# Patient Record
Sex: MALE | Race: WHITE | NOT HISPANIC OR LATINO | Employment: OTHER | URBAN - METROPOLITAN AREA
[De-identification: names, ages, dates, MRNs, and addresses within clinical notes are randomized per-mention and may not be internally consistent; named-entity substitution may affect disease eponyms.]

---

## 2022-06-13 ENCOUNTER — TRANSFERRED RECORDS (OUTPATIENT)
Dept: HEALTH INFORMATION MANAGEMENT | Facility: CLINIC | Age: 82
End: 2022-06-13

## 2022-06-28 ENCOUNTER — HOSPITAL ENCOUNTER (OUTPATIENT)
Dept: NUCLEAR MEDICINE | Facility: HOSPITAL | Age: 82
Discharge: HOME | End: 2022-06-28
Attending: INTERNAL MEDICINE
Payer: COMMERCIAL

## 2022-06-28 ENCOUNTER — HOSPITAL ENCOUNTER (OUTPATIENT)
Dept: CT IMAGING | Facility: HOSPITAL | Age: 82
Discharge: HOME | End: 2022-06-28
Attending: INTERNAL MEDICINE
Payer: COMMERCIAL

## 2022-06-28 DIAGNOSIS — C61 PROSTATE CANCER (H): ICD-10-CM

## 2022-06-28 LAB
CREAT BLD-MCNC: 0.9 MG/DL (ref 0.7–1.3)
GFR SERPL CREATININE-BSD FRML MDRD: >60 ML/MIN/1.73M2

## 2022-06-28 PROCEDURE — 78306 BONE IMAGING WHOLE BODY: CPT

## 2022-06-28 PROCEDURE — 250N000011 HC RX IP 250 OP 636: Performed by: INTERNAL MEDICINE

## 2022-06-28 PROCEDURE — 74177 CT ABD & PELVIS W/CONTRAST: CPT

## 2022-06-28 PROCEDURE — 343N000001 HC RX 343: Performed by: INTERNAL MEDICINE

## 2022-06-28 PROCEDURE — A9503 TC99M MEDRONATE: HCPCS | Performed by: INTERNAL MEDICINE

## 2022-06-28 PROCEDURE — 82565 ASSAY OF CREATININE: CPT

## 2022-06-28 RX ORDER — TC 99M MEDRONATE 20 MG/10ML
20-30 INJECTION, POWDER, LYOPHILIZED, FOR SOLUTION INTRAVENOUS ONCE
Status: COMPLETED | OUTPATIENT
Start: 2022-06-28 | End: 2022-06-28

## 2022-06-28 RX ORDER — IOPAMIDOL 755 MG/ML
75 INJECTION, SOLUTION INTRAVASCULAR ONCE
Status: COMPLETED | OUTPATIENT
Start: 2022-06-28 | End: 2022-06-28

## 2022-06-28 RX ADMIN — IOPAMIDOL 75 ML: 755 INJECTION, SOLUTION INTRAVENOUS at 07:20

## 2022-06-28 RX ADMIN — TC 99M MEDRONATE 27 MCI.: 20 INJECTION, POWDER, LYOPHILIZED, FOR SOLUTION INTRAVENOUS at 06:50

## 2022-07-21 ENCOUNTER — TRANSFERRED RECORDS (OUTPATIENT)
Dept: HEALTH INFORMATION MANAGEMENT | Facility: CLINIC | Age: 82
End: 2022-07-21

## 2023-01-01 ENCOUNTER — TELEPHONE (OUTPATIENT)
Dept: INTERVENTIONAL RADIOLOGY/VASCULAR | Facility: CLINIC | Age: 83
End: 2023-01-01
Payer: COMMERCIAL

## 2023-01-01 ENCOUNTER — TRANSFERRED RECORDS (OUTPATIENT)
Dept: HEALTH INFORMATION MANAGEMENT | Facility: CLINIC | Age: 83
End: 2023-01-01
Payer: COMMERCIAL

## 2023-01-01 ENCOUNTER — TRANSCRIBE ORDERS (OUTPATIENT)
Dept: OTHER | Age: 83
End: 2023-01-01

## 2023-01-01 ENCOUNTER — APPOINTMENT (OUTPATIENT)
Dept: INTERPRETER SERVICES | Facility: CLINIC | Age: 83
End: 2023-01-01
Payer: COMMERCIAL

## 2023-01-01 ENCOUNTER — ONCOLOGY VISIT (OUTPATIENT)
Dept: ONCOLOGY | Facility: CLINIC | Age: 83
End: 2023-01-01
Attending: INTERNAL MEDICINE
Payer: COMMERCIAL

## 2023-01-01 ENCOUNTER — PATIENT OUTREACH (OUTPATIENT)
Dept: ONCOLOGY | Facility: CLINIC | Age: 83
End: 2023-01-01
Payer: COMMERCIAL

## 2023-01-01 ENCOUNTER — PRE VISIT (OUTPATIENT)
Dept: ONCOLOGY | Facility: CLINIC | Age: 83
End: 2023-01-01
Payer: COMMERCIAL

## 2023-01-01 ENCOUNTER — TELEPHONE (OUTPATIENT)
Dept: INTERVENTIONAL RADIOLOGY/VASCULAR | Facility: HOSPITAL | Age: 83
End: 2023-01-01
Payer: COMMERCIAL

## 2023-01-01 ENCOUNTER — HOSPITAL ENCOUNTER (OUTPATIENT)
Dept: ULTRASOUND IMAGING | Facility: HOSPITAL | Age: 83
Discharge: HOME | End: 2023-09-05
Attending: INTERNAL MEDICINE | Admitting: INTERNAL MEDICINE
Payer: COMMERCIAL

## 2023-01-01 ENCOUNTER — TRANSFERRED RECORDS (OUTPATIENT)
Dept: HEALTH INFORMATION MANAGEMENT | Facility: CLINIC | Age: 83
End: 2023-01-01

## 2023-01-01 VITALS
WEIGHT: 126.4 LBS | DIASTOLIC BLOOD PRESSURE: 84 MMHG | OXYGEN SATURATION: 99 % | SYSTOLIC BLOOD PRESSURE: 147 MMHG | HEART RATE: 89 BPM | RESPIRATION RATE: 16 BRPM | TEMPERATURE: 98.2 F | BODY MASS INDEX: 17.14 KG/M2

## 2023-01-01 VITALS
SYSTOLIC BLOOD PRESSURE: 136 MMHG | OXYGEN SATURATION: 98 % | RESPIRATION RATE: 20 BRPM | DIASTOLIC BLOOD PRESSURE: 74 MMHG | HEIGHT: 72 IN | TEMPERATURE: 98.3 F | WEIGHT: 127 LBS | BODY MASS INDEX: 17.2 KG/M2 | HEART RATE: 70 BPM

## 2023-01-01 DIAGNOSIS — Z01.818 PRE-OP EXAM: Primary | ICD-10-CM

## 2023-01-01 DIAGNOSIS — C61 PROSTATE CANCER (H): ICD-10-CM

## 2023-01-01 DIAGNOSIS — C61 PROSTATE CANCER (H): Primary | ICD-10-CM

## 2023-01-01 LAB
ERYTHROCYTE [DISTWIDTH] IN BLOOD BY AUTOMATED COUNT: 18.1 % (ref 10–15)
HCT VFR BLD AUTO: 34.1 % (ref 40–53)
HGB BLD-MCNC: 11 G/DL (ref 13.3–17.7)
INR PPP: 1.1 (ref 0.85–1.15)
MCH RBC QN AUTO: 27.2 PG (ref 26.5–33)
MCHC RBC AUTO-ENTMCNC: 32.3 G/DL (ref 31.5–36.5)
MCV RBC AUTO: 84 FL (ref 78–100)
PATH REPORT.COMMENTS IMP SPEC: ABNORMAL
PATH REPORT.COMMENTS IMP SPEC: YES
PATH REPORT.FINAL DX SPEC: ABNORMAL
PATH REPORT.GROSS SPEC: ABNORMAL
PATH REPORT.MICROSCOPIC SPEC OTHER STN: ABNORMAL
PATH REPORT.RELEVANT HX SPEC: ABNORMAL
PHOTO IMAGE: ABNORMAL
PLATELET # BLD AUTO: 195 10E3/UL (ref 150–450)
RBC # BLD AUTO: 4.04 10E6/UL (ref 4.4–5.9)
SPECIMEN STATUS: NORMAL
WBC # BLD AUTO: 13.6 10E3/UL (ref 4–11)

## 2023-01-01 PROCEDURE — 85014 HEMATOCRIT: CPT | Performed by: RADIOLOGY

## 2023-01-01 PROCEDURE — 36415 COLL VENOUS BLD VENIPUNCTURE: CPT | Performed by: RADIOLOGY

## 2023-01-01 PROCEDURE — 88342 IMHCHEM/IMCYTCHM 1ST ANTB: CPT | Mod: 26 | Performed by: PATHOLOGY

## 2023-01-01 PROCEDURE — 250N000011 HC RX IP 250 OP 636: Performed by: RADIOLOGY

## 2023-01-01 PROCEDURE — G0463 HOSPITAL OUTPT CLINIC VISIT: HCPCS | Performed by: INTERNAL MEDICINE

## 2023-01-01 PROCEDURE — 272N000717 US BIOPSY LIVER

## 2023-01-01 PROCEDURE — 88341 IMHCHEM/IMCYTCHM EA ADD ANTB: CPT | Mod: 26 | Performed by: PATHOLOGY

## 2023-01-01 PROCEDURE — 88307 TISSUE EXAM BY PATHOLOGIST: CPT | Mod: 26 | Performed by: PATHOLOGY

## 2023-01-01 PROCEDURE — 250N000011 HC RX IP 250 OP 636: Mod: JZ | Performed by: INTERNAL MEDICINE

## 2023-01-01 PROCEDURE — 99205 OFFICE O/P NEW HI 60 MIN: CPT | Performed by: INTERNAL MEDICINE

## 2023-01-01 PROCEDURE — 85610 PROTHROMBIN TIME: CPT | Performed by: RADIOLOGY

## 2023-01-01 PROCEDURE — 88305 TISSUE EXAM BY PATHOLOGIST: CPT | Mod: TC | Performed by: INTERNAL MEDICINE

## 2023-01-01 PROCEDURE — 88333 PATH CONSLTJ SURG CYTO XM 1: CPT | Mod: 26 | Performed by: PATHOLOGY

## 2023-01-01 RX ORDER — NALOXONE HYDROCHLORIDE 0.4 MG/ML
0.4 INJECTION, SOLUTION INTRAMUSCULAR; INTRAVENOUS; SUBCUTANEOUS
Status: DISCONTINUED | OUTPATIENT
Start: 2023-01-01 | End: 2023-01-01 | Stop reason: HOSPADM

## 2023-01-01 RX ORDER — NALOXONE HYDROCHLORIDE 0.4 MG/ML
0.2 INJECTION, SOLUTION INTRAMUSCULAR; INTRAVENOUS; SUBCUTANEOUS
Status: DISCONTINUED | OUTPATIENT
Start: 2023-01-01 | End: 2023-01-01 | Stop reason: HOSPADM

## 2023-01-01 RX ORDER — FENTANYL CITRATE 50 UG/ML
25-50 INJECTION, SOLUTION INTRAMUSCULAR; INTRAVENOUS EVERY 5 MIN PRN
Status: DISCONTINUED | OUTPATIENT
Start: 2023-01-01 | End: 2023-01-01 | Stop reason: HOSPADM

## 2023-01-01 RX ORDER — HEPARIN SODIUM (PORCINE) LOCK FLUSH IV SOLN 100 UNIT/ML 100 UNIT/ML
500 SOLUTION INTRAVENOUS ONCE
Status: COMPLETED | OUTPATIENT
Start: 2023-01-01 | End: 2023-01-01

## 2023-01-01 RX ORDER — LIDOCAINE 40 MG/G
CREAM TOPICAL
Status: DISCONTINUED | OUTPATIENT
Start: 2023-01-01 | End: 2023-01-01 | Stop reason: HOSPADM

## 2023-01-01 RX ORDER — FLUMAZENIL 0.1 MG/ML
0.2 INJECTION, SOLUTION INTRAVENOUS
Status: DISCONTINUED | OUTPATIENT
Start: 2023-01-01 | End: 2023-01-01 | Stop reason: HOSPADM

## 2023-01-01 RX ADMIN — MIDAZOLAM HYDROCHLORIDE 1 MG: 1 INJECTION, SOLUTION INTRAMUSCULAR; INTRAVENOUS at 10:42

## 2023-01-01 RX ADMIN — FENTANYL CITRATE 50 MCG: 50 INJECTION, SOLUTION INTRAMUSCULAR; INTRAVENOUS at 10:45

## 2023-01-01 RX ADMIN — HEPARIN 500 UNITS: 100 SYRINGE at 12:54

## 2023-01-01 ASSESSMENT — PAIN SCALES - GENERAL: PAINLEVEL: NO PAIN (0)

## 2023-02-15 ENCOUNTER — TRANSFERRED RECORDS (OUTPATIENT)
Dept: HEALTH INFORMATION MANAGEMENT | Facility: CLINIC | Age: 83
End: 2023-02-15

## 2023-03-30 ENCOUNTER — TRANSFERRED RECORDS (OUTPATIENT)
Dept: HEALTH INFORMATION MANAGEMENT | Facility: CLINIC | Age: 83
End: 2023-03-30

## 2023-07-18 ENCOUNTER — LAB REQUISITION (OUTPATIENT)
Dept: LAB | Facility: CLINIC | Age: 83
End: 2023-07-18
Payer: COMMERCIAL

## 2023-07-18 DIAGNOSIS — C61 MALIGNANT NEOPLASM OF PROSTATE (H): ICD-10-CM

## 2023-07-19 LAB
CREAT SERPL-MCNC: 0.98 MG/DL (ref 0.67–1.17)
ERYTHROCYTE [DISTWIDTH] IN BLOOD BY AUTOMATED COUNT: NORMAL %
GFR SERPL CREATININE-BSD FRML MDRD: 77 ML/MIN/1.73M2
HCT VFR BLD AUTO: NORMAL %
HGB BLD-MCNC: NORMAL G/DL
MCH RBC QN AUTO: NORMAL PG
MCHC RBC AUTO-ENTMCNC: NORMAL G/DL
MCV RBC AUTO: NORMAL FL
PLATELET # BLD AUTO: NORMAL 10*3/UL
RBC # BLD AUTO: NORMAL 10*6/UL
WBC # BLD AUTO: NORMAL 10*3/UL

## 2023-07-19 PROCEDURE — P9604 ONE-WAY ALLOW PRORATED TRIP: HCPCS | Mod: ORL | Performed by: FAMILY MEDICINE

## 2023-07-19 PROCEDURE — 36415 COLL VENOUS BLD VENIPUNCTURE: CPT | Mod: ORL | Performed by: FAMILY MEDICINE

## 2023-07-19 PROCEDURE — 82565 ASSAY OF CREATININE: CPT | Mod: ORL | Performed by: FAMILY MEDICINE

## 2023-07-26 ENCOUNTER — TRANSFERRED RECORDS (OUTPATIENT)
Dept: HEALTH INFORMATION MANAGEMENT | Facility: CLINIC | Age: 83
End: 2023-07-26

## 2023-07-31 ENCOUNTER — TRANSFERRED RECORDS (OUTPATIENT)
Dept: HEALTH INFORMATION MANAGEMENT | Facility: CLINIC | Age: 83
End: 2023-07-31
Payer: COMMERCIAL

## 2023-08-01 ENCOUNTER — MEDICAL CORRESPONDENCE (OUTPATIENT)
Dept: HEALTH INFORMATION MANAGEMENT | Facility: CLINIC | Age: 83
End: 2023-08-01
Payer: COMMERCIAL

## 2023-08-11 ENCOUNTER — TELEPHONE (OUTPATIENT)
Dept: INTERVENTIONAL RADIOLOGY/VASCULAR | Facility: CLINIC | Age: 83
End: 2023-08-11
Payer: COMMERCIAL

## 2023-08-15 ENCOUNTER — TELEPHONE (OUTPATIENT)
Dept: INTERVENTIONAL RADIOLOGY/VASCULAR | Facility: CLINIC | Age: 83
End: 2023-08-15

## 2023-08-15 RX ORDER — ASPIRIN 81 MG/1
81 TABLET, CHEWABLE ORAL DAILY
COMMUNITY

## 2023-09-05 NOTE — PRE-PROCEDURE
GENERAL PRE-PROCEDURE:   Procedure:  Ultrasound guided liver biopsy with moderate sedation  Date/Time:  9/5/2023 10:04 AM    Risks and benefits: Risks, benefits and alternatives were discussed    Consent given by:  Patient  Patient states understanding of procedure being performed: Yes    Patient's understanding of procedure matches consent: Yes    Procedure consent matches procedure scheduled: Yes    Expected level of sedation:  Moderate  Appropriately NPO:  Yes  Mallampati  :  Grade 2- soft palate, base of uvula, tonsillar pillars, and portion of posterior pharyngeal wall visible  Lungs:  Lungs clear with good breath sounds bilaterally  Heart:  Normal heart sounds and rate  History & Physical reviewed:  History and physical reviewed and no updates needed  Statement of review:  I have reviewed the lab findings, diagnostic data, medications, and the plan for sedation

## 2023-09-05 NOTE — PROGRESS NOTES
Pt discharged to home accompanied by daughter.  Instructions reviewed and acknowledged.  Port de-accessed per protocol.

## 2023-11-21 NOTE — PROGRESS NOTES
New Patient Oncology Nurse Navigator Note     Referring provider:   [facility] Tanvir Lemos     Referred to (specialty): Medical Oncology    Requested provider (if applicable):   Dr. Bruno     Date Referral Received:   11/21/23     Evaluation for :   Consideration of Lutetium 177- PSMA Therapy/Pluvicto   Metastatic Castrate Resistant Prostate Cancer    Clinical History (per Nurse review of records provided):    Patient with mCRPC, with progression on multiple lines of treatment, referred for discussion of Pluvicto.  See [facility] notes (Care Everywhere) for full history.     Pertinent urology notes, pathology/labs & imaging bookmarked**    Records Location (Care Everywhere, Media, etc.):   [facility]   Care Everywhere      Please call Roseline (daughter) [phone]    I called Roseline, daughter, to discuss referral to oncology.  I introduced my role and reviewed what this consult visit will entail/what to expect.  I reviewed the location and gave contact numbers including new patient scheduling and clinic phone numbers.   Roseline, has no other questions at this time.    I forwarded on referral with scheduling instructions for the following     PLAN: SCHEDULE: HOLD: Dr. Rogers @ [facility], 11/28,  3-4 PM, NEW, in person    I warm transferred call to our new patient scheduling to finalize appointment.    Shara Vu, RN, BSN  Oncology New Patient Nurse Navigator   [facility]  [phone]

## 2023-11-22 NOTE — TELEPHONE ENCOUNTER
RECORDS STATUS - ALL OTHER DIAGNOSIS      Action    Action Taken 11/22/23  Spoke w/ [facility] - they will fax over Bx Report from 9/2014. Advised slides no longer exist.     Report received, sent to [facility] for STAT upload, emailed to NN email, JEOVANNY VARGHESE     Spoke w/ Marlys @ [facility] - they will fax Radiation Treatment Summary, contact [facility] to push imaging & fax reports. Marlys will also reach out to Dr. Lemos to sign off on [facility] Report (9/2023) & [facility] Report (6/2022).   11:58 AM    Records from [facility], imaging reports, [facility] & [facility] reports received, sent to [facility] for STAT upload.     Bx report from [facility] received, sent to HIM, emailed to  nicholas, JEOVANNY VARGHESE   2:11 PM      RECORDS [facility], [facility]   DATE RECEIVED: 11/22   NOTES STATUS DETAILS   OFFICE NOTE from referring provider DENISE - [facility] Dr. Tanvir Lemos   OFFICE NOTE from medical oncologist CE - [facility] Dr. Lemos: 9/26/23   OFFICE NOTE from radiation oncologist CE - [facility],  [facility]  Dr. Julio César Rodriguez: 8/11/23    [facility]  Dr. Darwin Hernadez: 6/13/05 - 8/18/05   DISCHARGE SUMMARY from hospital CE - [facility] 10/16/23, 6/23/23, 9/24/21   DISCHARGE REPORT from the ER CE Many   OPERATIVE REPORT CE - [facility] 10/20/23, 6/26/23, 4/19/23, 11/16/22, 6/24/22, 3/18/22, 12/15/21, 6/25/21, 2/3/21, 12/30/20: Cystoscopy  6/28/23: Open Abdominal Exploration  9/26/21: Left Uretal Stent Exchange   MEDICATION LIST CE [facility]   LABS     PATHOLOGY REPORTS Shwetha Roblero - Not requested per reports  12/30/20: [id number]  2/3/21: [id number]    [facility]  9/5/23: Surg Path   ANYTHING RELATED TO DIAGNOSIS CE - [facility] 11/13/23   GENONOMIC TESTING     TYPE: [facility] - Received 11/22 [facility], [facility]   IMAGING (NEED IMAGES & REPORT)     CT SCANS PACS [facility]  5/23/21 - 10/17/23    [facility]  3/24/17   MRI PACS [facility]  10/18/19, 10/18/18   XR PACS [facility]  12/30/20 - 10/20/23    ULTRASOUND PACS [facility]  3/2/17   PET PACS [facility]  11/17/23, 2/2023

## 2023-11-28 NOTE — LETTER
11/28/2023         RE: Jose Maria Lawrence  [address]  [place] [address]        Dear Colleague,    Thank you for referring your patient, Jose Maria Lawrence, to the [facility]. Please see a copy of my visit note below.    [facility]    NEW PATIENT VISIT NOTE    PATIENT NAME: Jose Maria Lawrence MRN # [id number]  DATE OF VISIT: November 28, 2023 YOB: 1940    REFERRING PROVIDER: Tanvir Lemos MD  [facility] [place]  [address] [place] [address]    CANCER TYPE: Prostate cancer  STAGE: IV - bony metastasis       BRIEF ONCOLOGIC HISTORY:  From 12/20 - 7/22 he was on Abiraterone + Prednisone. Upon progression, he completed six cycles of Docetaxel (C1 at 50mg/m2, C2-6 at 75mg/m2) from 7/21 - 11/3/22.  He tolerated this very well, with a very appropriate decline in his PSA, from 40 June 2022, to 5.2 on 10/13/22.  Unfortunately, shortly after completing chemotherapy in November 2022, he developed recurrent skeletal pain. 2/15/23 PSMA PET/CT showed recurrent scattered osseous metastases, concordant from a PSA that had risen from 6.7 November 28, 2022, 254.2 on February 3, 2023. From 3/1/22 - 6/3/22, he completed 3 cycles of Radium 223, with resolution of his rib pain, but interval increase in his PSA.   From 6/23 - 7/7/23 he was admitted to [facility] with gross hematuria, with clots x 1-2 days. Urology consulted, attempted continuous bladder irrigation, but bleeding continued. On 6/26/23 he underwent cystoscopy, clot evacuation, fulguration and stent exchange. On 6/28, he developed worsening shortness of breath, CT CAP showed new moderate ascites, CT cystogram showed intraperitoneal bladder rupture. On 6/28, he underwent emergent abdominal exploration with repair of the intraperitoneal bladder rupture, s/p 4L of urine irrigated and drained from the abdomen. 5mm rupture of the posterior bladder found, repaired. S/p  placement of suprapubic catheter.     His PSMA PET from July 26, 2023 after 4 cycles of Radium 223 showed interval resolution of the thoracic and left rib lesions, but increase in the osseous lesions in the lumbar spine, and lower pelvis.  This corresponds with his areas of pain.  Furthermore, a CT abd/pelvis obtained in the hospital July 2023, showed by bilobar liver lesions, multiple, of which were not PSMA PET avid.  Thus, he is not a good candidate for Lutetium therapy, as the disease in his liver is most life-threatening and would likely not respond to PSMA directed therapy.     Melinda started Cabazitaxel at 16mg/m2, with Neulasta support, dose reduced for age and performance status on 8/22/23. His cycle 2 was delayed by 1 week for UTI, erendira-suprapubic catheter cellulitis. He is s/p cycle 3 on 9/26/23. Cycle 4 was delayed due to hypovolemic shock secondary urinary infection and diarrhea. Suprapubic catheter was replaced on 10/17/23 and ureteral stent replacement on 10/20/23. He has been recovering.       TREATMENT SUMMARY:  9/20/2004: Radical retropubic prostatectomy, pathology showed extensive grade 5+4 adenocarcinoma involving nearly the entire prostate with lymphatic and PNI. R and L pelvic nodes were negative. Seminal vesicles were also involved. pT3b. S/p 2 years Lupron and adjuvant radiation 8/2005.   6/11/2010 PSA 0.04, consistent with biochemical recurrence. 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 He was started on Bicalutamide in addition to ADT.   11/6/20 Bone scan showed increased uptake in the L sixth rib, very suspicious for metastases.   12/2/20 He was switched to Abiraterone 1000mg daily along with prednisone 5mg daily.   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   6/28/22 bone scan shows marked uptake in the L posterior sixth rib, additional metastasis i the nearly upper thoracic spine, vertebral bodies, suspected at T4 and T5.  7/21/2022 patient initiated docetaxel 60 mg/m2 given a dose reduction secondary to patient's age.  He will also receive on body Neulasta for neutropenia prevention.  8/11/22 Docetaxel increased to 75mg/m2, completed 6 cycles 11/3/22  3/1/22 - 6/3/22, he completed 3 cycles of Radium 223, with resolution of his rib pain, but interval increase in his PSA, from  54.2 February 3, 2023, to 123 6 June 2, 2023.   7/26/23 PET/CT: While the lesions in the thoracic spine and left ribs have essentially resolved, other sites of radiotracer positive osseous lesions have increased In radiotracer activity and there is development of multiple new radiotracer positive osseous lesions suspicious for progression of disease.  8/22/23 C1D1 Cabazitaxel 16mg/m2 prednisone 10mg daily with Neulasta  9/5/23 L liver lesion: metastatic adenocarcinoma, moderately differentiated, with moderate to marked fibrosis, most consistent with metastasis from prostatic carcinoma. CARIS shows genomic JERMAINE but no actionable mutations      CURRENT INTERVENTIONS:  Cabazitaxel 16 mg/m   every other week with prednisone 5 mg q 12h daily,   leuprolide  (gets with [facility])  Zometa every 3 months  Ferra heme      HISTORY OF PRESENT ILLNESS   Jose Maria Lawrence is 83 year old male with PMH of hypertension, diabetes mellitus type II and CRPC has been referred for lutetium therapy.     Jose Maria is here with his daughter. He can speak English but his daughter helps with translations. Following history is presented by patient. History from charts is appended above.     When he came to [facility] during a routine physical examination he was noted to have prostate abnormality. He was diagnosed with prostate cancer. He had resection of prostate. He was taking medications and things were fine for long  time. He was being monitored by Dr. Shashi Workman. He had rising PSA values and he was referred to medical oncologist - Dr. Lemos. It was spreading and he took rounds of chemotherapy.     He had bleeding in the bladder and he had cauterization. He ended up having a bladder perforation and was in the ICU. He had to come off chemotherapy during his time in ICU. He now has indwelling urinary catheter. He does get recurrent urinary infections. He has been referred now for his progressive disease.     He is currently retired. He is not active due to his disease. He has a significant set back this time around. He used to be active in the past and used to walk 5 mi daily. He has 4 kids and they help him out. They have been providing with food, medicines. He has sold his car. He is now weak that he is post several rounds of chemotherapy.      PAST MEDICAL HISTORY     Past Medical History:   Diagnosis Date    Cancer (H)     Diabetes (H)     Heart disease     Hypertension           CURRENT OUTPATIENT MEDICATIONS     Current Outpatient Medications   Medication Sig    aspirin (ASA) 81 MG chewable tablet aspirin 81 mg chewable tablet    aspirin (ASA) 81 MG chewable tablet Take 81 mg by mouth daily    atorvastatin (LIPITOR) 40 MG tablet Take 40 mg by mouth daily    calcium carbonate 500 mg, elemental, 1250 (500 Ca) MG tablet chewable Calcium Antacid 200 mg calcium (500 mg) chewable tablet   CHEW 1 TABLET (500 MG) BY MOUTH 3 TIMES DAILY IF NEEDED.    Cranberry 125 MG TABS     Cyanocobalamin 500 MCG LOZG     diclofenac (VOLTAREN) 1 % topical gel diclofenac 1 % topical gel    diclofenac (VOLTAREN) 50 MG EC tablet Take 50 mg by mouth 2 times daily    docusate sodium (DSS) 100 MG capsule docusate sodium 100 mg capsule    DULoxetine (CYMBALTA) 30 MG capsule TAKE 1 CAPSULE (30 MG) BY MOUTH ONCE DAILY FOR 7 DAYS.    famotidine (PEPCID) 20 MG tablet Take 20 mg by mouth At Bedtime    Fesoterodine Fumarate 8 MG TB24     flavoxATE (URISPAS) 100  MG tablet Take 100 mg by mouth 3 times daily    GAS RELIEF 125 MG CAPS TAKE 1 CAPSULE BY MOUTH TWICE DAILY AS NEEDED FOR GAS    ibuprofen (ADVIL/MOTRIN) 200 MG tablet Take 200 mg by mouth every 4 hours as needed for pain    indomethacin (INDOCIN) 50 MG capsule indomethacin 50 mg capsule    Lansoprazole (PREVACID PO) lansoprazole    LANTUS SOLOSTAR 100 UNIT/ML soln INJECT 30 UNITS SUBCUTANEOUSLY BEFORE BEDTIME.    leuprolide (ELIGARD) 7.5 MG Inject 7.5 mg Subcutaneous every 30 days    lidocaine (LIDODERM) 5 % patch Apply on dry, clean, hairless skin. Apply 1 patch to painful area of skin for up to to 12 hours within 24 hour period.    lidocaine-prilocaine (EMLA) 2.5-2.5 % external cream Apply topically as needed for moderate pain    lisinopril (ZESTRIL) 40 MG tablet Take 1 tablet by mouth daily at 2 pm    loratadine (CLARITIN) 10 MG tablet TAKE 1 TABLET (10 MG) BY MOUTH ONCE DAILY IF NEEDED FOR ALLERGY SYMPTOMS    lovastatin (MEVACOR) 20 MG tablet Take 20 mg by mouth    magic mouthwash suspension, diphenhydrAMINE, lidocaine, aluminum-magnesium & simethicone, (FIRST-MOUTHWASH BLM) compounding kit Swish and swallow 5-10 mLs in mouth every 6 hours as needed for mouth sores    metFORMIN (GLUCOPHAGE-XR) 750 MG 24 hr tablet Take 750 mg by mouth daily (with dinner)    mirabegron (MYRBETRIQ) 25 MG 24 hr tablet Take 25 mg by mouth daily    morphine (MS CONTIN) 15 MG CR tablet     NICORETTE STARTER KIT 2 MG MT GUM 1 PIECE OF GUM AS NEEDED    nitroFURantoin macrocrystal-monohydrate (MACROBID) 100 MG capsule TAKE 1 CAPSULE BY MOUTH EVERY 12 HOURS WITH MEALS FOR 7 DAYS    ondansetron (ZOFRAN ODT) 4 MG ODT tab TAKE 1 TABLET BY MOUTH THREE TIMES DAILY AS NEEDED FOR NAUSEA    oxyCODONE (ROXICODONE) 5 MG tablet TAKE 1-2 TABLETS EVERY 4 HOURS IF NEEDED    phenazopyridine (PYRIDIUM) 200 MG tablet phenazopyridine 200 mg tablet   TAKE 1 TABLET BY MOUTH 3 TIMES A DAY    predniSONE (DELTASONE) 5 MG tablet TAKE 1 TABLET BY MOUTH 2 TIMES  DAILY. TOTAL DAILY DOSE IS 10 MG    prochlorperazine (COMPAZINE) 10 MG tablet TAKE 1 TABLET ORALLY EVERY 6 HOURS AS NEEDED FOR NAUSEA AND VOMITING.    senna-docusate (SENEXON-S) 8.6-50 MG tablet Senexon-S 8.6 mg-50 mg tablet   TAKE 1-2 TABLETS BY MOUTH 2 TIMES DAILY IF NEEDED FOR CONSTIPATION.    simethicone (MYLICON) 125 MG chewable tablet Take 125 mg by mouth 2 times daily    SM MILK OF MAGNESIA 1200 MG/15ML suspension TAKE 30 ML ORALLY DAILY AS NEEDED FOR CONSTIPATION. TAKE IF NO BOWEL MOVEMENT FOR TWO DAYS    traZODone (DESYREL) 50 MG tablet     Vitamin D3 (CHOLECALCIFEROL) 25 mcg (1000 units) tablet Take by mouth daily     No current facility-administered medications for this visit.        ALLERGIES      Allergies   Allergen Reactions    Levofloxacin Muscle Pain (Myalgia)        SOCIAL HISTORY   He is single and lives alone. His son lives with him now at night to help him.     He does not smoke cigarettes. He used to smoke pack a day but has quit 5 yrs ago.      FAMILY HISTORY   Diabetes mellitus type II - father      REVIEW OF SYSTEMS   As above in the HPI, o/w complete 12-point ROS was negative.     PHYSICAL EXAM   BP (!) 147/84 (BP Location: Right arm, Patient Position: Sitting, Cuff Size: Adult Regular)   Pulse 89   Temp 98.2  F (36.8  C) (Oral)   Resp 16   Wt 57.3 kg (126 lb 6.4 oz)   SpO2 99%   BMI 17.14 kg/m     Wt Readings from Last 3 Encounters:   09/05/23 57.6 kg (127 lb)     GEN: NAD  HEENT: PERRL, EOMI, no icterus, injection or pallor. Oropharynx is clear.  NECK: no cervical or supraclavicular lymphadenopathy  LUNGS: clear bilaterally  CV: regular, no murmurs, rubs, or gallops  ABDOMEN: soft, non-tender, non-distended, normal bowel sounds, no hepatosplenomegaly by percussion or palpation  EXT: warm, well perfused, no edema  NEURO: alert  SKIN: no rashes     LABORATORY AND IMAGING STUDIES                     ASSESSMENT    Castration resistant metastatic prostate cancer having progressed on  multiple lines of therapy  Hypertension, diabetes mellitus type II   ECOG PS 1    DISCUSSION   I had lengthy discussion with patient who is accompanied by his daughter at this visit. He has progressed on multiple lines of therapy. He has been referred for lutetium therapy.     I reviewed lutetium 177 bound to PSMA antibody. Prostate-specific membrane antigen (PSMA) is highly expressed in metastatic castration-resistant prostate cancer. Lutetium-177 (177Lu)-PSMA-617 is a radioligand therapy that delivers beta-particle radiation to PSMA-expressing cells and the surrounding microenvironment.       I reviewed the rationale, mechanism of action of this therapy. Lutetium would be administered intravenous every 6 weeks for up to 6 doses.       In a single arm phase II study in heavily pretreated patients therapy with lutetium showed objective response in 14 of 17 patients with measurable disease - Lancet Oncol. 2018 Jun;19(6):825-833. In a phase III study (VISION) evaluating 177Lu-PSMA-617 in patients who had metastatic castration-resistant prostate cancer previously treated with at least one novel anti-androgen receptor agent and one or two taxane regimens, it improved progression free survival (median 8.7 vs 3.4 months; HR 0.4) and overall survival (15.3 vs  11.3 months; HR 0.62).  N Engl J Med 2021; 385:2060-1977. It is well tolerated and has manageable side effects.                 I have reviewed actual limages from his PET scan. He does have uptake in several bones and in left pelvic side wall. However on the visual impression the uptake seems mild except for pelvic side wall. I reviewed data from TheraP trial in [place] where they found that patients with SUV mean values of >10 derive most benefit. Lancet Oncol. 2022 Nov;23(11):0319-1388.With lower SUV mean values - toxicities are the same (primarily the cytopenias) but the response can be minimal. I will reach out to Dr. Villareal to check if we can get SUV mean  values for his tumor. Often SUV max is reported and not SUV mean which is much lower typically. But if we have low SUV max then it rules out any benefit from therapy.            PLAN      I have extensively discussed lutetium 177 (Pluvicto) therapy with patient.   He is not a good candidate for lutetium at this time due to very low SUV uptake in the metastatic sites.   Patients with SUVmean values of <10 have marginal benefit from therapy.  He has rapidly progressive disease having progressed on androgen deprivation therapy, chemotherapy with docetaxel & cabazitaxel and abiraterone with prednisone.   He does not seem to have received novel androgen receptor blocker like enzalutamide, apalutamide or darolutamide.   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  One strategy would be enzalutamide with niraparib or other PARPi      Over 60 min spent on day of visit including review of tests, obtaining/reviewing separately obtained history/physical exam, counseling patient, ordering medications/tests/procedures, communicating with PCP/consultants, and documenting in electronic medical record.    Kar Rogers  Hematologist and Medical Oncologist  [facility]        Kar Rogers  Adj ,  Division of Hematology, Oncology & Transplantation  [facility].

## 2023-11-28 NOTE — NURSING NOTE
Oncology Rooming Note    November 28, 2023 2:54 PM   Jose Maria Lawrence is a 83 year old male who presents for:    Chief Complaint   Patient presents with    Oncology Clinic Visit     Prostate cancer     Initial Vitals: BP (!) 147/84 (BP Location: Right arm, Patient Position: Sitting, Cuff Size: Adult Regular)   Pulse 89   Temp 98.2  F (36.8  C) (Oral)   Resp 16   Wt 57.3 kg (126 lb 6.4 oz)   SpO2 99%   BMI 17.14 kg/m   Estimated body mass index is 17.14 kg/m  as calculated from the following:    Height as of 9/5/23: 1.829 m (6').    Weight as of this encounter: 57.3 kg (126 lb 6.4 oz). Body surface area is 1.71 meters squared.  No Pain (0) Comment: Data Unavailable   No LMP for male patient.  Allergies reviewed: Yes  Medications reviewed: Yes    Medications: Medication refills not needed today.  Pharmacy name entered into [facility]: [id number] - [place], [place] - [address] [facility]    Clinical concerns: none       Neha Hernandez

## 2023-11-28 NOTE — PROGRESS NOTES
[facility] CANCER CLINIC    NEW PATIENT VISIT NOTE    PATIENT NAME: Jose Maria Lawrence MRN # [id number]  DATE OF VISIT: November 28, 2023 YOB: 1940    REFERRING PROVIDER: Tanvir Lemos MD  [facility]  [address] [place] [address]    CANCER TYPE: Prostate cancer  STAGE: IV - bony metastasis       BRIEF ONCOLOGIC HISTORY:  From 12/20 - 7/22 he was on Abiraterone + Prednisone. Upon progression, he completed six cycles of Docetaxel (C1 at 50mg/m2, C2-6 at 75mg/m2) from 7/21 - 11/3/22.  He tolerated this very well, with a very appropriate decline in his PSA, from 40 June 2022, to 5.2 on 10/13/22.  Unfortunately, shortly after completing chemotherapy in November 2022, he developed recurrent skeletal pain. 2/15/23 PSMA PET/CT showed recurrent scattered osseous metastases, concordant from a PSA that had risen from 6.7 November 28, 2022, 254.2 on February 3, 2023. From 3/1/22 - 6/3/22, he completed 3 cycles of Radium 223, with resolution of his rib pain, but interval increase in his PSA.   From 6/23 - 7/7/23 he was admitted to [facility] with gross hematuria, with clots x 1-2 days. Urology consulted, attempted continuous bladder irrigation, but bleeding continued. On 6/26/23 he underwent cystoscopy, clot evacuation, fulguration and stent exchange. On 6/28, he developed worsening shortness of breath, CT CAP showed new moderate ascites, CT cystogram showed intraperitoneal bladder rupture. On 6/28, he underwent emergent abdominal exploration with repair of the intraperitoneal bladder rupture, s/p 4L of urine irrigated and drained from the abdomen. 5mm rupture of the posterior bladder found, repaired. S/p placement of suprapubic catheter.     His PSMA PET from July 26, 2023 after 4 cycles of Radium 223 showed interval resolution of the thoracic and left rib lesions, but increase in the osseous lesions in the lumbar spine, and lower pelvis.  This corresponds with his  areas of pain.  Furthermore, a CT abd/pelvis obtained in the hospital July 2023, showed by bilobar liver lesions, multiple, of which were not PSMA PET avid.  Thus, he is not a good candidate for Lutetium therapy, as the disease in his liver is most life-threatening and would likely not respond to PSMA directed therapy.     Melinda started Cabazitaxel at 16mg/m2, with Neulasta support, dose reduced for age and performance status on 8/22/23. His cycle 2 was delayed by 1 week for UTI, erendira-suprapubic catheter cellulitis. He is s/p cycle 3 on 9/26/23. Cycle 4 was delayed due to hypovolemic shock secondary urinary infection and diarrhea. Suprapubic catheter was replaced on 10/17/23 and ureteral stent replacement on 10/20/23. He has been recovering.       TREATMENT SUMMARY:  9/20/2004: Radical retropubic prostatectomy, pathology showed extensive grade 5+4 adenocarcinoma involving nearly the entire prostate with lymphatic and PNI. R and L pelvic nodes were negative. Seminal vesicles were also involved. pT3b. S/p 2 years Lupron and adjuvant radiation 8/2005.   6/11/2010 PSA 0.04, consistent with biochemical recurrence. 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 He was started on Bicalutamide in addition to ADT.   11/6/20 Bone scan showed increased uptake in the L sixth rib, very suspicious for metastases.   12/2/20 He was switched to Abiraterone 1000mg daily along with prednisone 5mg daily.   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   6/28/22 bone scan shows marked uptake in the L posterior sixth rib, additional metastasis i the nearly upper thoracic spine, vertebral bodies, suspected at T4 and T5.  7/21/2022 patient initiated docetaxel 60 mg/m2 given a dose reduction secondary to  patient's age.  He will also receive on body Neulasta for neutropenia prevention.  8/11/22 Docetaxel increased to 75mg/m2, completed 6 cycles 11/3/22  3/1/22 - 6/3/22, he completed 3 cycles of Radium 223, with resolution of his rib pain, but interval increase in his PSA, from  54.2 February 3, 2023, to 123 6 June 2, 2023.   7/26/23 PET/CT: While the lesions in the thoracic spine and left ribs have essentially resolved, other sites of radiotracer positive osseous lesions have increased In radiotracer activity and there is development of multiple new radiotracer positive osseous lesions suspicious for progression of disease.  8/22/23 C1D1 Cabazitaxel 16mg/m2 prednisone 10mg daily with Neulasta  9/5/23 L liver lesion: metastatic adenocarcinoma, moderately differentiated, with moderate to marked fibrosis, most consistent with metastasis from prostatic carcinoma. CARIS shows genomic JERMAINE but no actionable mutations      CURRENT INTERVENTIONS:  Cabazitaxel 16 mg/m   every other week with prednisone 5 mg q 12h daily,   leuprolide  (gets with [facility])  Zometa every 3 months  Ferra heme      HISTORY OF PRESENT ILLNESS   Jose Maria Lawrence is 83 year old male with PMH of hypertension, diabetes mellitus type II and CRPC has been referred for lutetium therapy.     Jose Maria is here with his daughter. He can speak English but his daughter helps with translations. Following history is presented by patient. History from charts is appended above.     When he came to [facility] during a routine physical examination he was noted to have prostate abnormality. He was diagnosed with prostate cancer. He had resection of prostate. He was taking medications and things were fine for long time. He was being monitored by Dr. Shashi Workman. He had rising PSA values and he was referred to medical oncologist - Dr. Lemos. It was spreading and he took rounds of chemotherapy.     He had bleeding in the bladder and he had cauterization. He ended up having  a bladder perforation and was in the ICU. He had to come off chemotherapy during his time in ICU. He now has indwelling urinary catheter. He does get recurrent urinary infections. He has been referred now for his progressive disease.     He is currently retired. He is not active due to his disease. He has a significant set back this time around. He used to be active in the past and used to walk 5 mi daily. He has 4 kids and they help him out. They have been providing with food, medicines. He has sold his car. He is now weak that he is post several rounds of chemotherapy.      PAST MEDICAL HISTORY     Past Medical History:   Diagnosis Date    Cancer (H)     Diabetes (H)     Heart disease     Hypertension           CURRENT OUTPATIENT MEDICATIONS     Current Outpatient Medications   Medication Sig    aspirin (ASA) 81 MG chewable tablet aspirin 81 mg chewable tablet    aspirin (ASA) 81 MG chewable tablet Take 81 mg by mouth daily    atorvastatin (LIPITOR) 40 MG tablet Take 40 mg by mouth daily    calcium carbonate 500 mg, elemental, 1250 (500 Ca) MG tablet chewable Calcium Antacid 200 mg calcium (500 mg) chewable tablet   CHEW 1 TABLET (500 MG) BY MOUTH 3 TIMES DAILY IF NEEDED.    Cranberry 125 MG TABS     Cyanocobalamin 500 MCG LOZG     diclofenac (VOLTAREN) 1 % topical gel diclofenac 1 % topical gel    diclofenac (VOLTAREN) 50 MG EC tablet Take 50 mg by mouth 2 times daily    docusate sodium (DSS) 100 MG capsule docusate sodium 100 mg capsule    DULoxetine (CYMBALTA) 30 MG capsule TAKE 1 CAPSULE (30 MG) BY MOUTH ONCE DAILY FOR 7 DAYS.    famotidine (PEPCID) 20 MG tablet Take 20 mg by mouth At Bedtime    Fesoterodine Fumarate 8 MG TB24     flavoxATE (URISPAS) 100 MG tablet Take 100 mg by mouth 3 times daily    GAS RELIEF 125 MG CAPS TAKE 1 CAPSULE BY MOUTH TWICE DAILY AS NEEDED FOR GAS    ibuprofen (ADVIL/MOTRIN) 200 MG tablet Take 200 mg by mouth every 4 hours as needed for pain    indomethacin (INDOCIN) 50 MG capsule  indomethacin 50 mg capsule    Lansoprazole (PREVACID PO) lansoprazole    LANTUS SOLOSTAR 100 UNIT/ML soln INJECT 30 UNITS SUBCUTANEOUSLY BEFORE BEDTIME.    leuprolide (ELIGARD) 7.5 MG Inject 7.5 mg Subcutaneous every 30 days    lidocaine (LIDODERM) 5 % patch Apply on dry, clean, hairless skin. Apply 1 patch to painful area of skin for up to to 12 hours within 24 hour period.    lidocaine-prilocaine (EMLA) 2.5-2.5 % external cream Apply topically as needed for moderate pain    lisinopril (ZESTRIL) 40 MG tablet Take 1 tablet by mouth daily at 2 pm    loratadine (CLARITIN) 10 MG tablet TAKE 1 TABLET (10 MG) BY MOUTH ONCE DAILY IF NEEDED FOR ALLERGY SYMPTOMS    lovastatin (MEVACOR) 20 MG tablet Take 20 mg by mouth    magic mouthwash suspension, diphenhydrAMINE, lidocaine, aluminum-magnesium & simethicone, (FIRST-MOUTHWASH BLM) compounding kit Swish and swallow 5-10 mLs in mouth every 6 hours as needed for mouth sores    metFORMIN (GLUCOPHAGE-XR) 750 MG 24 hr tablet Take 750 mg by mouth daily (with dinner)    mirabegron (MYRBETRIQ) 25 MG 24 hr tablet Take 25 mg by mouth daily    morphine (MS CONTIN) 15 MG CR tablet     NICORETTE STARTER KIT 2 MG MT GUM 1 PIECE OF GUM AS NEEDED    nitroFURantoin macrocrystal-monohydrate (MACROBID) 100 MG capsule TAKE 1 CAPSULE BY MOUTH EVERY 12 HOURS WITH MEALS FOR 7 DAYS    ondansetron (ZOFRAN ODT) 4 MG ODT tab TAKE 1 TABLET BY MOUTH THREE TIMES DAILY AS NEEDED FOR NAUSEA    oxyCODONE (ROXICODONE) 5 MG tablet TAKE 1-2 TABLETS EVERY 4 HOURS IF NEEDED    phenazopyridine (PYRIDIUM) 200 MG tablet phenazopyridine 200 mg tablet   TAKE 1 TABLET BY MOUTH 3 TIMES A DAY    predniSONE (DELTASONE) 5 MG tablet TAKE 1 TABLET BY MOUTH 2 TIMES DAILY. TOTAL DAILY DOSE IS 10 MG    prochlorperazine (COMPAZINE) 10 MG tablet TAKE 1 TABLET ORALLY EVERY 6 HOURS AS NEEDED FOR NAUSEA AND VOMITING.    senna-docusate (SENEXON-S) 8.6-50 MG tablet Senexon-S 8.6 mg-50 mg tablet   TAKE 1-2 TABLETS BY MOUTH 2 TIMES  DAILY IF NEEDED FOR CONSTIPATION.    simethicone (MYLICON) 125 MG chewable tablet Take 125 mg by mouth 2 times daily    SM MILK OF MAGNESIA 1200 MG/15ML suspension TAKE 30 ML ORALLY DAILY AS NEEDED FOR CONSTIPATION. TAKE IF NO BOWEL MOVEMENT FOR TWO DAYS    traZODone (DESYREL) 50 MG tablet     Vitamin D3 (CHOLECALCIFEROL) 25 mcg (1000 units) tablet Take by mouth daily     No current facility-administered medications for this visit.        ALLERGIES      Allergies   Allergen Reactions    Levofloxacin Muscle Pain (Myalgia)        SOCIAL HISTORY   He is single and lives alone. His son lives with him now at night to help him.     He does not smoke cigarettes. He used to smoke pack a day but has quit 5 yrs ago.      FAMILY HISTORY   Diabetes mellitus type II - father      REVIEW OF SYSTEMS   As above in the HPI, o/w complete 12-point ROS was negative.     PHYSICAL EXAM   BP (!) 147/84 (BP Location: Right arm, Patient Position: Sitting, Cuff Size: Adult Regular)   Pulse 89   Temp 98.2  F (36.8  C) (Oral)   Resp 16   Wt 57.3 kg (126 lb 6.4 oz)   SpO2 99%   BMI 17.14 kg/m     Wt Readings from Last 3 Encounters:   09/05/23 57.6 kg (127 lb)     GEN: NAD  HEENT: PERRL, EOMI, no icterus, injection or pallor. Oropharynx is clear.  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 He has progressed on multiple lines of therapy. He has been referred for lutetium  therapy.     I reviewed lutetium 177 bound to PSMA antibody. Prostate-specific membrane antigen (PSMA) is highly expressed in metastatic castration-resistant prostate cancer. Lutetium-177 (177Lu)-PSMA-617 is a radioligand therapy that delivers beta-particle radiation to PSMA-expressing cells and the surrounding microenvironment.       I reviewed the rationale, mechanism of action of this therapy. Lutetium would be administered intravenous every 6 weeks for up to 6 doses.       In a single arm phase II study in heavily pretreated patients therapy with lutetium showed objective response in 14 of 17 patients with measurable disease - Lancet Oncol. 2018 Justin;19(6):825-833. In a phase III study (VISION) evaluating 177Lu-PSMA-617 in patients who had metastatic castration-resistant prostate cancer previously treated with at least one novel anti-androgen receptor agent and one or two taxane regimens, it improved progression free survival (median 8.7 vs 3.4 months; HR 0.4) and overall survival (15.3 vs  11.3 months; HR 0.62).  N Engl J Med 2021; 385:5173-9245. It is well tolerated and has manageable side effects.                 I have reviewed actual limages from his PET scan. He does have uptake in several bones and in left pelvic side wall. However on the visual impression the uptake seems mild except for pelvic side wall. I reviewed data from TheraP trial in [place] where they found that patients with SUV mean values of >10 derive most benefit. Lancet Oncol. 2022 Nov;23(11):8130-0473.With lower SUV mean values - toxicities are the same (primarily the cytopenias) but the response can be minimal. I will reach out to Dr. Villareal to check if we can get SUV mean values for his tumor. Often SUV max is reported and not SUV mean which is much lower typically. But if we have low SUV max then it rules out any benefit from therapy.            PLAN      I have extensively discussed lutetium 177 (Pluvicto) therapy with patient.   He  is not a good candidate for lutetium at this time due to very low SUV uptake in the metastatic sites.   Patients with SUVmean values of <10 have marginal benefit from therapy.  He has rapidly progressive disease having progressed on androgen deprivation therapy, chemotherapy with docetaxel & cabazitaxel and abiraterone with prednisone.   He does not seem to have received novel androgen receptor blocker like enzalutamide, apalutamide or darolutamide.   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  One strategy would be enzalutamide with niraparib or other PARPi      Over 60 min spent on day of visit including review of tests, obtaining/reviewing separately obtained history/physical exam, counseling patient, ordering medications/tests/procedures, communicating with PCP/consultants, and documenting in electronic medical record.    Kar Rogers  Hematologist and Medical Oncologist  [facility]        Kar Rogers  Adj ,  Division of Hematology, Oncology & Transplantation  [facility].

## 2023-11-29 NOTE — PROGRESS NOTES
[facility]: Cancer Care Initial Note                                    Discussion with Patient:                                                      Met with patient and his daughter to introduce self and role.  Patient filled out and signed LAKEISHA to speak with both daughters.           Assessment:                                                      Initial  Current living arrangement:: I live in a private home  Informal Support system:: Children  Equipment Currently Used at Home: none  Bed or wheelchair confined:: No  Mobility Status: Independent  Transportation means:: None  Medication adherence problem (GOAL):: No  Referrals Placed: None    No assessment indicated    Intervention/Education provided during outreach:                                                       Patient to follow up as scheduled at next appt    Maureen Mclain RN, BSN  Oncology RN Care Coordinator  [facility]

## 2024-01-01 ENCOUNTER — TRANSFERRED RECORDS (OUTPATIENT)
Dept: HEALTH INFORMATION MANAGEMENT | Facility: CLINIC | Age: 84
End: 2024-01-01
Payer: COMMERCIAL

## 2024-01-01 ENCOUNTER — LAB REQUISITION (OUTPATIENT)
Dept: LAB | Facility: CLINIC | Age: 84
End: 2024-01-01
Payer: COMMERCIAL

## 2024-01-01 ENCOUNTER — HOSPITAL ENCOUNTER (OUTPATIENT)
Facility: HOSPITAL | Age: 84
End: 2024-01-01
Attending: STUDENT IN AN ORGANIZED HEALTH CARE EDUCATION/TRAINING PROGRAM | Admitting: STUDENT IN AN ORGANIZED HEALTH CARE EDUCATION/TRAINING PROGRAM
Payer: COMMERCIAL

## 2024-01-01 DIAGNOSIS — I10 ESSENTIAL (PRIMARY) HYPERTENSION: ICD-10-CM

## 2024-01-01 DIAGNOSIS — D64.9 ANEMIA, UNSPECIFIED: ICD-10-CM

## 2024-01-01 LAB
ANION GAP SERPL CALCULATED.3IONS-SCNC: 13 MMOL/L (ref 7–15)
BUN SERPL-MCNC: 14.2 MG/DL (ref 8–23)
CALCIUM SERPL-MCNC: 9.4 MG/DL (ref 8.8–10.2)
CHLORIDE SERPL-SCNC: 107 MMOL/L (ref 98–107)
CREAT SERPL-MCNC: 0.64 MG/DL (ref 0.67–1.17)
DEPRECATED HCO3 PLAS-SCNC: 19 MMOL/L (ref 22–29)
EGFRCR SERPLBLD CKD-EPI 2021: >90 ML/MIN/1.73M2
ERYTHROCYTE [DISTWIDTH] IN BLOOD BY AUTOMATED COUNT: 18.9 % (ref 10–15)
GLUCOSE SERPL-MCNC: 81 MG/DL (ref 70–99)
HCT VFR BLD AUTO: 31.8 % (ref 40–53)
HGB BLD-MCNC: 9.9 G/DL (ref 13.3–17.7)
MCH RBC QN AUTO: 30.7 PG (ref 26.5–33)
MCHC RBC AUTO-ENTMCNC: 31.1 G/DL (ref 31.5–36.5)
MCV RBC AUTO: 99 FL (ref 78–100)
PLATELET # BLD AUTO: 425 10E3/UL (ref 150–450)
POTASSIUM SERPL-SCNC: 4 MMOL/L (ref 3.4–5.3)
RBC # BLD AUTO: 3.23 10E6/UL (ref 4.4–5.9)
SODIUM SERPL-SCNC: 139 MMOL/L (ref 135–145)
WBC # BLD AUTO: 13.2 10E3/UL (ref 4–11)

## 2024-01-01 PROCEDURE — 36415 COLL VENOUS BLD VENIPUNCTURE: CPT | Mod: ORL | Performed by: FAMILY MEDICINE

## 2024-01-01 PROCEDURE — 85027 COMPLETE CBC AUTOMATED: CPT | Mod: ORL | Performed by: FAMILY MEDICINE

## 2024-01-01 PROCEDURE — 80048 BASIC METABOLIC PNL TOTAL CA: CPT | Mod: ORL | Performed by: FAMILY MEDICINE

## 2024-01-01 PROCEDURE — P9604 ONE-WAY ALLOW PRORATED TRIP: HCPCS | Mod: ORL | Performed by: FAMILY MEDICINE

## 2024-01-01 RX ORDER — CEFAZOLIN SODIUM/WATER 2 G/20 ML
2 SYRINGE (ML) INTRAVENOUS
Status: CANCELLED | OUTPATIENT
Start: 2024-01-01

## 2024-01-01 RX ORDER — CEFAZOLIN SODIUM/WATER 2 G/20 ML
2 SYRINGE (ML) INTRAVENOUS SEE ADMIN INSTRUCTIONS
Status: CANCELLED | OUTPATIENT
Start: 2024-01-01

## (undated) RX ORDER — FENTANYL CITRATE 50 UG/ML
INJECTION, SOLUTION INTRAMUSCULAR; INTRAVENOUS
Status: DISPENSED
Start: 2023-01-01

## (undated) RX ORDER — HEPARIN SODIUM (PORCINE) LOCK FLUSH IV SOLN 100 UNIT/ML 100 UNIT/ML
SOLUTION INTRAVENOUS
Status: DISPENSED
Start: 2023-01-01